# Patient Record
Sex: FEMALE | Race: OTHER | NOT HISPANIC OR LATINO | URBAN - METROPOLITAN AREA
[De-identification: names, ages, dates, MRNs, and addresses within clinical notes are randomized per-mention and may not be internally consistent; named-entity substitution may affect disease eponyms.]

---
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
## 2020-10-12 ENCOUNTER — EMERGENCY (EMERGENCY)
Facility: HOSPITAL | Age: 20
LOS: 1 days | Discharge: HOME | End: 2020-10-12
Attending: EMERGENCY MEDICINE | Admitting: EMERGENCY MEDICINE
Payer: MEDICAID

## 2020-10-12 VITALS
SYSTOLIC BLOOD PRESSURE: 105 MMHG | DIASTOLIC BLOOD PRESSURE: 61 MMHG | HEART RATE: 105 BPM | HEIGHT: 65 IN | TEMPERATURE: 99 F | RESPIRATION RATE: 16 BRPM | OXYGEN SATURATION: 100 % | WEIGHT: 115.08 LBS

## 2020-10-12 LAB — S PYO DNA THROAT QL NAA+PROBE: SIGNIFICANT CHANGE UP

## 2020-10-12 PROCEDURE — 87798 DETECT AGENT NOS DNA AMP: CPT

## 2020-10-12 PROCEDURE — 87651 STREP A DNA AMP PROBE: CPT

## 2020-10-12 PROCEDURE — 99282 EMERGENCY DEPT VISIT SF MDM: CPT

## 2020-10-12 PROCEDURE — U0003: CPT

## 2020-10-12 PROCEDURE — 99283 EMERGENCY DEPT VISIT LOW MDM: CPT

## 2020-10-12 NOTE — ED PROVIDER NOTE - PATIENT PORTAL LINK FT
You can access the FollowMyHealth Patient Portal offered by [facility] by registering at the following website: [email or web address]. By joining "[email or web address]’s FollowMyHealth portal, you will also be able to view your health information using other applications (apps) compatible with our system.

## 2020-10-12 NOTE — ED PROVIDER NOTE - NSFOLLOWUPINSTRUCTIONS_ED_ALL_ED_FT
WHAT YOU NEED TO KNOW:    Pharyngitis, or sore throat, is inflammation of the tissues and structures in your pharynx (throat). Pharyngitis is most often caused by bacteria. It may also be caused by a cold or flu virus. Other causes include smoking, allergies, or acid reflux.     DISCHARGE INSTRUCTIONS:    Call 911 for any of the following:   •You have trouble breathing or swallowing because your throat is swollen or sore.          Return to the emergency department if:   •You are drooling because it hurts too much to swallow.      •Your fever is higher than 102°F (39°C) or lasts longer than 3 days.      •You are confused.      •You taste blood in your throat.      Contact your healthcare provider if:   •Your throat pain gets worse.      •You have a painful lump in your throat that does not go away after 5 days.      •Your symptoms do not improve after 5 days.      •You have questions or concerns about your condition or care.      Medicines: Viral pharyngitis will go away on its own without treatment. Your sore throat should start to feel better in 3 to 5 days for both viral and bacterial infections. You may need any of the following:   •Antibiotics treat a bacterial infection.      •NSAIDs, such as ibuprofen, help decrease swelling, pain, and fever. NSAIDs can cause stomach bleeding or kidney problems in certain people. If you take blood thinner medicine, always ask your healthcare provider if NSAIDs are safe for you. Always read the medicine label and follow directions.      •Acetaminophen decreases pain and fever. It is available without a doctor's order. Ask how much to take and how often to take it. Follow directions. Acetaminophen can cause liver damage if not taken correctly.      •Take your medicine as directed. Contact your healthcare provider if you think your medicine is not helping or if you have side effects. Tell him or her if you are allergic to any medicine. Keep a list of the medicines, vitamins, and herbs you take. Include the amounts, and when and why you take them. Bring the list or the pill bottles to follow-up visits. Carry your medicine list with you in case of an emergency.      Manage your symptoms:   •Gargle salt water. Mix ¼ teaspoon salt in an 8 ounce glass of warm water and gargle. This may help decrease swelling in your throat.      •Drink liquids as directed. You may need to drink more liquids than usual. Liquids may help soothe your throat and prevent dehydration. Ask how much liquid to drink each day and which liquids are best for you.      •Use a cool-steam humidifier to help moisten the air in your room and calm your cough.      •Soothe your throat with cough drops, ice, soft foods, or popsicles.      Prevent the spread of pharyngitis: Cover your mouth and nose when you cough or sneeze. Do not share food or drinks. Wash your hands often. Use soap and water. If soap and water are unavailable, use an alcohol based hand .     Follow up with your healthcare provider as directed: Write down your questions so you remember to ask them during your visits.        © Copyright [employer] 2020           back to top                          © Copyright [employer] 2020

## 2020-10-12 NOTE — ED ADULT NURSE NOTE - CHPI ED NUR DURATION
Problem: Patient Care Overview  Goal: Plan of Care Review   08/04/19 0942   Coping/Psychosocial   Plan of Care Reviewed With patient;daughter   Plan of Care Review   Progress improving   CONTINUING TO PROGRESS WITH AMBULATION AND ACTIVITY.  APPEARS MORE ALERT TODAY.         today

## 2020-10-12 NOTE — ED PROVIDER NOTE - THROAT FINDINGS
no vesicles/NO STRIDOR/TONSILLAR SWELLING/NO TONGUE ELEVATION/no exudate/THROAT RED/uvula midline/NO DROOLING

## 2020-10-13 ENCOUNTER — TRANSCRIPTION ENCOUNTER (OUTPATIENT)
Age: 20
End: 2020-10-13

## 2020-10-13 PROBLEM — J45.909 UNSPECIFIED ASTHMA, UNCOMPLICATED: Chronic | Status: ACTIVE | Noted: 2019-06-12

## 2020-10-13 LAB — SARS-COV-2 RNA SPEC QL NAA+PROBE: SIGNIFICANT CHANGE UP

## 2021-11-28 NOTE — ED PEDIATRIC NURSE NOTE - NS CRAFFT RELAX ALCOHOL
Pt reports she is nervous = hives.
Pt with steady gait from lobby to a1 - urine results received.
No

## 2024-03-27 NOTE — ED ADULT NURSE NOTE - PRO INTERPRETER NEED 2
2024  EMPLOYEE INFORMATION: EMPLOYER INFORMATION:   NAME: Aylin Jordan Fresvii    : 1972 [phone]    DATE OF INJURY/EVENT: 3/14/2024            Location: [facility]   Treating Provider: Celestina Jackson PA-C  Time In:  12:27 PM Time Out:  5:23 PM      DIAGNOSIS:   1. Acute hip pain, left    2. Left groin pain    3. Muscle strain of left gluteal region, initial encounter    4. Acute left ankle pain      STATUS: This injury is determined to be WORK RELATED.    RETURN TO WORK:Employee may return to work with restrictions.   Return Date: 3/27/2024            RESTRICTIONS: Restrictions are to be followed at work and at home.  Restrictions are in effect until next follow-up visit.    LEFT GLUTEAL/GROIN  1. Limit manual material handling (ie. Pushing, pulling, lifting and carrying) to 10lbs with both arms, in front of body, between waist to chest heigh, and a half reach maximum.   2. No overhead lifting or reaching.   3. No bending at waist.  May perform twisting as tolerated.   4. No ladders.  May use step stools, stairs and/or elevated surfaces as tolerated.   5. No contact and/or collision sports-related activities.     LEFT HIP  1. No running, jumping, or hopping-related activities.   2. No kneeling, crawling, and/or squatting-related activities.  3.  No prolonged standing or walking more than 30 minutes during work shift.  After 30 minutes, please allow for sitting break.     LEFT ANKLE   - Please see above       TREATMENT PLAN:   Medications for this injury/condition:   - May use 500mg-1000 mg of Tylenol (1-2 extra strength tablets) every 6 hours as needed for breakthrough pain. Do not exceed 6 tablets in a 24 hour period. If you experience side effects, stop this medication.  For severe pain may take 3 tablets twice daily as needed.  - May continue to apply OTC lidocaine patches to affected area  - May take OTC melatonin 1 tablet at night prior  to bed      Referral/Consult: None  Diagnostic Testing: XR HIP 2 VIEWS LEFT AND PELVIS       Instructions:   - XR left hip and pelvis negative  - Ice 20 minutes on 20 minutes off for one hour with goal 2-3 times per day or more  - Recommend stretching exercises 10 repetitions and 2-3 sets per day. Including: lumbar rotation, side bending, lumbar flexion, quadricep stretch, hamstring stretch, calf stretch, IT band stretch, ankle plantarflexion (point toes down), ankle dorsiflexion (point toes towards face)    NEXT RETURN VISIT:  one week  Thank you for the privilege of providing medical care for this injury/condition.  If there are any questions, please call the occupational health clinic at Dept: [phone].    Electronically signed on 3/27/2024 at 5:23 PM by:   Celestina Jackson PA-C   [facility]    The physician below agrees with the plan and restrictions placed on the patient by the provider above.  Antonio Dixon DO           English

## 2024-04-08 NOTE — ED ADULT NURSE NOTE - BREATHING, MLM
LOV 3/8/24  F/U not scheduled     Albuterol inhaler   Last given 11/8/23 18 g, 3 refills     Please advise, thank you.   
fine  
Spontaneous, unlabored and symmetrical